# Patient Record
Sex: MALE | Race: WHITE | NOT HISPANIC OR LATINO | Employment: UNEMPLOYED | URBAN - METROPOLITAN AREA
[De-identification: names, ages, dates, MRNs, and addresses within clinical notes are randomized per-mention and may not be internally consistent; named-entity substitution may affect disease eponyms.]

---

## 2017-01-08 ENCOUNTER — HOSPITAL ENCOUNTER (EMERGENCY)
Facility: MEDICAL CENTER | Age: 32
End: 2017-01-08
Attending: EMERGENCY MEDICINE

## 2017-01-08 VITALS
SYSTOLIC BLOOD PRESSURE: 113 MMHG | RESPIRATION RATE: 18 BRPM | BODY MASS INDEX: 24.52 KG/M2 | WEIGHT: 185 LBS | HEIGHT: 73 IN | HEART RATE: 112 BPM | DIASTOLIC BLOOD PRESSURE: 94 MMHG | TEMPERATURE: 99 F

## 2017-01-08 DIAGNOSIS — F43.0 STRESS REACTION: ICD-10-CM

## 2017-01-08 DIAGNOSIS — F19.10 POLYSUBSTANCE ABUSE (HCC): ICD-10-CM

## 2017-01-08 DIAGNOSIS — G89.29 CHRONIC LOW BACK PAIN WITHOUT SCIATICA, UNSPECIFIED BACK PAIN LATERALITY: ICD-10-CM

## 2017-01-08 DIAGNOSIS — S00.11XA PERIORBITAL ECCHYMOSIS OF RIGHT EYE, INITIAL ENCOUNTER: ICD-10-CM

## 2017-01-08 DIAGNOSIS — M54.50 CHRONIC LOW BACK PAIN WITHOUT SCIATICA, UNSPECIFIED BACK PAIN LATERALITY: ICD-10-CM

## 2017-01-08 PROCEDURE — 99285 EMERGENCY DEPT VISIT HI MDM: CPT

## 2017-01-08 PROCEDURE — 93005 ELECTROCARDIOGRAM TRACING: CPT | Performed by: EMERGENCY MEDICINE

## 2017-01-08 ASSESSMENT — LIFESTYLE VARIABLES
DO YOU DRINK ALCOHOL: YES
SUBSTANCE_ABUSE: 1

## 2017-01-08 ASSESSMENT — ENCOUNTER SYMPTOMS
NAUSEA: 0
VOMITING: 0
HEADACHES: 0
BACK PAIN: 1
ABDOMINAL PAIN: 0
SHORTNESS OF BREATH: 0
FEVER: 0

## 2017-01-08 ASSESSMENT — PAIN SCALES - GENERAL: PAINLEVEL_OUTOF10: 9

## 2017-01-08 NOTE — DISCHARGE INSTRUCTIONS
Back Pain, Adult  Back pain is very common in adults. The cause of back pain is rarely dangerous and the pain often gets better over time. The cause of your back pain may not be known. Some common causes of back pain include:  · Strain of the muscles or ligaments supporting the spine.  · Wear and tear (degeneration) of the spinal disks.  · Arthritis.  · Direct injury to the back.  For many people, back pain may return. Since back pain is rarely dangerous, most people can learn to manage this condition on their own.  HOME CARE INSTRUCTIONS  Watch your back pain for any changes. The following actions may help to lessen any discomfort you are feeling:  · Remain active. It is stressful on your back to sit or  one place for long periods of time. Do not sit, drive, or  one place for more than 30 minutes at a time. Take short walks on even surfaces as soon as you are able. Try to increase the length of time you walk each day.  · Exercise regularly as directed by your health care provider. Exercise helps your back heal faster. It also helps avoid future injury by keeping your muscles strong and flexible.  · Do not stay in bed. Resting more than 1-2 days can delay your recovery.  · Pay attention to your body when you bend and lift. The most comfortable positions are those that put less stress on your recovering back. Always use proper lifting techniques, including:  · Bending your knees.  · Keeping the load close to your body.  · Avoiding twisting.  · Find a comfortable position to sleep. Use a firm mattress and lie on your side with your knees slightly bent. If you lie on your back, put a pillow under your knees.  · Avoid feeling anxious or stressed. Stress increases muscle tension and can worsen back pain. It is important to recognize when you are anxious or stressed and learn ways to manage it, such as with exercise.  · Take medicines only as directed by your health care provider. Over-the-counter  medicines to reduce pain and inflammation are often the most helpful. Your health care provider may prescribe muscle relaxant drugs. These medicines help dull your pain so you can more quickly return to your normal activities and healthy exercise.  · Apply ice to the injured area:  · Put ice in a plastic bag.  · Place a towel between your skin and the bag.  · Leave the ice on for 20 minutes, 2-3 times a day for the first 2-3 days. After that, ice and heat may be alternated to reduce pain and spasms.  · Maintain a healthy weight. Excess weight puts extra stress on your back and makes it difficult to maintain good posture.  SEEK MEDICAL CARE IF:  · You have pain that is not relieved with rest or medicine.  · You have increasing pain going down into the legs or buttocks.  · You have pain that does not improve in one week.  · You have night pain.  · You lose weight.  · You have a fever or chills.  SEEK IMMEDIATE MEDICAL CARE IF:   · You develop new bowel or bladder control problems.  · You have unusual weakness or numbness in your arms or legs.  · You develop nausea or vomiting.  · You develop abdominal pain.  · You feel faint.     This information is not intended to replace advice given to you by your health care provider. Make sure you discuss any questions you have with your health care provider.     Document Released: 12/18/2006 Document Revised: 01/08/2016 Document Reviewed: 04/21/2015  [employer] Interactive Patient Education ©2016 [employer]    Generalized Anxiety Disorder  Generalized anxiety disorder (DEMETRI) is a mental disorder. It interferes with life functions, including relationships, work, and school.  DEMETRI is different from normal anxiety, which everyone experiences at some point in their lives in response to specific life events and activities. Normal anxiety actually helps us prepare for and get through these life events and activities. Normal anxiety goes away after the event or activity is over.   DEMETRI  causes anxiety that is not necessarily related to specific events or activities. It also causes excess anxiety in proportion to specific events or activities. The anxiety associated with DEMETRI is also difficult to control. DEMETRI can vary from mild to severe. People with severe DEMETRI can have intense waves of anxiety with physical symptoms (panic attacks).   SYMPTOMS  The anxiety and worry associated with DEMETRI are difficult to control. This anxiety and worry are related to many life events and activities and also occur more days than not for 6 months or longer. People with DEMETRI also have three or more of the following symptoms (one or more in children):  · Restlessness.    · Fatigue.  · Difficulty concentrating.    · Irritability.  · Muscle tension.  · Difficulty sleeping or unsatisfying sleep.  DIAGNOSIS  DEMETRI is diagnosed through an assessment by your health care provider. Your health care provider will ask you questions about your mood, physical symptoms, and events in your life. Your health care provider may ask you about your medical history and use of alcohol or drugs, including prescription medicines. Your health care provider may also do a physical exam and blood tests. Certain medical conditions and the use of certain substances can cause symptoms similar to those associated with DEMETRI. Your health care provider may refer you to a mental health specialist for further evaluation.  TREATMENT  The following therapies are usually used to treat DEMETRI:   · Medication. Antidepressant medication usually is prescribed for long-term daily control. Antianxiety medicines may be added in severe cases, especially when panic attacks occur.    · Talk therapy (psychotherapy). Certain types of talk therapy can be helpful in treating DEMETRI by providing support, education, and guidance. A form of talk therapy called cognitive behavioral therapy can teach you healthy ways to think about and react to daily life events and activities.  · Stress  management techniques. These include yoga, meditation, and exercise and can be very helpful when they are practiced regularly.  A mental health specialist can help determine which treatment is best for you. Some people see improvement with one therapy. However, other people require a combination of therapies.     This information is not intended to replace advice given to you by your health care provider. Make sure you discuss any questions you have with your health care provider.     Document Released: 04/14/2014 Document Revised: 01/08/2016 Document Reviewed: 04/14/2014  [employer] Interactive Patient Education ©2016 [employer]
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
## 2017-01-08 NOTE — ED AVS SNAPSHOT
1/8/2017          Daron Herrera  [address] [place] [address]    Dear Daron:    [facility] wants to ensure your discharge home is safe and you or your loved ones have had all your questions answered regarding your care after you leave the hospital.    You may receive a telephone call within two days of your discharge.  This call is to make certain you understand your discharge instructions as well as ensure we provided you with the best care possible during your stay with us.     The call will only last approximately 3-5 minutes and will be done by a nurse.    Once again, we want to ensure your discharge home is safe and that you have a clear understanding of any next steps in your care.  If you have any questions or concerns, please do not hesitate to contact us, we are here for you.  Thank you for choosing [facility] for your healthcare needs.    Sincerely,    Steven Galicia    [facility]

## 2017-01-08 NOTE — ED AVS SNAPSHOT
[employer] Access Code: [email or web address]  Expires: 2/7/2017  1:57 AM    Your email address is not on file at [facility].  Email Addresses are required for you to sign up for [employer], please contact [phone] to verify your personal information and to provide your email address prior to attempting to register for [employer].    Daron [facility]  [address] [place] [address]    [employer]  A secure, online tool to manage your health information     [facility]’s [employer]® is a secure, online tool that connects you to your personalized health information from the privacy of your home -- day or night - making it very easy for you to manage your healthcare. Once the activation process is completed, you can even access your medical information using the [employer] jonathon, which is available for free in the Apple Jonathon store or Google Play store.     To learn more about [employer], visit [email or web address]    There are two levels of access available (as shown below):   My Chart Features  [facility] Primary Care Doctor [facility]  Specialists [facility]  Urgent  Care Non-[facility] Primary Care Doctor   Email your healthcare team securely and privately 24/7 X X X    Manage appointments: schedule your next appointment; view details of past/upcoming appointments X      Request prescription refills. X      View recent personal medical records, including lab and immunizations X X X X   View health record, including health history, allergies, medications X X X X   Read reports about your outpatient visits, procedures, consult and ER notes X X X X   See your discharge summary, which is a recap of your hospital and/or ER visit that includes your diagnosis, lab results, and care plan X X  X     How to register for [employer]:  Once your e-mail address has been verified, follow the following steps to sign up for [employer].     1. Go to  [email or web address]  2. Click on the Sign Up Now box, which takes you to the New Member Sign Up page. You will  need to provide the following information:  a. Enter your [employer] Access Code exactly as it appears at the top of this page. (You will not need to use this code after you’ve completed the sign-up process. If you do not sign up before the expiration date, you must request a new code.)   b. Enter your date of birth.   c. Enter your home email address.   d. Click Submit, and follow the next screen’s instructions.  3. Create a [employer] ID. This will be your [employer] login ID and cannot be changed, so think of one that is secure and easy to remember.  4. Create a [employer] password. You can change your password at any time.  5. Enter your Password Reset Question and Answer. This can be used at a later time if you forget your password.   6. Enter your e-mail address. This allows you to receive e-mail notifications when new information is available in [employer].  7. Click Sign Up. You can now view your health information.    For assistance activating your [employer] account, call [phone]

## 2017-01-08 NOTE — ED AVS SNAPSHOT
After Visit Summary                                                                                                                Daron Herrera   MRN: [id number]    Department:  [facility], Emergency Dept   Date of Visit:  1/8/2017            [facility], Emergency Dept    [address] [address]    Phone:  [phone]      You were seen by     Odette Hannah D.O.      Your Diagnosis Was     Chronic low back pain without sciatica, unspecified back pain laterality     M54.5, G89.29       Follow-up Information     1. Follow up with Your Physician In 2 days.    Specialty:  Emergency Medicine    Contact information    Varies          2. Follow up with [facility], Emergency Dept.    Specialty:  Emergency Medicine    Why:  If symptoms worsen    Contact information    [address] [place] [id number] [phone]      Medication Information     Review all of your home medications and newly ordered medications with your primary doctor and/or pharmacist as soon as possible. Follow medication instructions as directed by your doctor and/or pharmacist.     Please keep your complete medication list with you and share with your physician. Update the information when medications are discontinued, doses are changed, or new medications (including over-the-counter products) are added; and carry medication information at all times in the event of emergency situations.               Medication List      Notice     You have not been prescribed any medications.            Procedures and tests performed during your visit     EKG (ER)        Discharge Instructions       Back Pain, Adult  Back pain is very common in adults. The cause of back pain is rarely dangerous and the pain often gets better over time. The cause of your back pain may not be known. Some common causes of back pain include:  · Strain of the muscles or ligaments supporting the  spine.  · Wear and tear (degeneration) of the spinal disks.  · Arthritis.  · Direct injury to the back.  For many people, back pain may return. Since back pain is rarely dangerous, most people can learn to manage this condition on their own.  HOME CARE INSTRUCTIONS  Watch your back pain for any changes. The following actions may help to lessen any discomfort you are feeling:  · Remain active. It is stressful on your back to sit or  one place for long periods of time. Do not sit, drive, or  one place for more than 30 minutes at a time. Take short walks on even surfaces as soon as you are able. Try to increase the length of time you walk each day.  · Exercise regularly as directed by your health care provider. Exercise helps your back heal faster. It also helps avoid future injury by keeping your muscles strong and flexible.  · Do not stay in bed. Resting more than 1-2 days can delay your recovery.  · Pay attention to your body when you bend and lift. The most comfortable positions are those that put less stress on your recovering back. Always use proper lifting techniques, including:  · Bending your knees.  · Keeping the load close to your body.  · Avoiding twisting.  · Find a comfortable position to sleep. Use a firm mattress and lie on your side with your knees slightly bent. If you lie on your back, put a pillow under your knees.  · Avoid feeling anxious or stressed. Stress increases muscle tension and can worsen back pain. It is important to recognize when you are anxious or stressed and learn ways to manage it, such as with exercise.  · Take medicines only as directed by your health care provider. Over-the-counter medicines to reduce pain and inflammation are often the most helpful. Your health care provider may prescribe muscle relaxant drugs. These medicines help dull your pain so you can more quickly return to your normal activities and healthy exercise.  · Apply ice to the injured  area:  · Put ice in a plastic bag.  · Place a towel between your skin and the bag.  · Leave the ice on for 20 minutes, 2-3 times a day for the first 2-3 days. After that, ice and heat may be alternated to reduce pain and spasms.  · Maintain a healthy weight. Excess weight puts extra stress on your back and makes it difficult to maintain good posture.  SEEK MEDICAL CARE IF:  · You have pain that is not relieved with rest or medicine.  · You have increasing pain going down into the legs or buttocks.  · You have pain that does not improve in one week.  · You have night pain.  · You lose weight.  · You have a fever or chills.  SEEK IMMEDIATE MEDICAL CARE IF:   · You develop new bowel or bladder control problems.  · You have unusual weakness or numbness in your arms or legs.  · You develop nausea or vomiting.  · You develop abdominal pain.  · You feel faint.     This information is not intended to replace advice given to you by your health care provider. Make sure you discuss any questions you have with your health care provider.     Document Released: 12/18/2006 Document Revised: 01/08/2016 Document Reviewed: 04/21/2015  [employer] Interactive Patient Education ©2016 [employer]    Generalized Anxiety Disorder  Generalized anxiety disorder (DEMETRI) is a mental disorder. It interferes with life functions, including relationships, work, and school.  DEMETRI is different from normal anxiety, which everyone experiences at some point in their lives in response to specific life events and activities. Normal anxiety actually helps us prepare for and get through these life events and activities. Normal anxiety goes away after the event or activity is over.   DEMETRI causes anxiety that is not necessarily related to specific events or activities. It also causes excess anxiety in proportion to specific events or activities. The anxiety associated with DEMETRI is also difficult to control. DEMETRI can vary from mild to severe. People with severe DEMETRI  can have intense waves of anxiety with physical symptoms (panic attacks).   SYMPTOMS  The anxiety and worry associated with DEMETRI are difficult to control. This anxiety and worry are related to many life events and activities and also occur more days than not for 6 months or longer. People with DEMETRI also have three or more of the following symptoms (one or more in children):  · Restlessness.    · Fatigue.  · Difficulty concentrating.    · Irritability.  · Muscle tension.  · Difficulty sleeping or unsatisfying sleep.  DIAGNOSIS  DEMETRI is diagnosed through an assessment by your health care provider. Your health care provider will ask you questions about your mood, physical symptoms, and events in your life. Your health care provider may ask you about your medical history and use of alcohol or drugs, including prescription medicines. Your health care provider may also do a physical exam and blood tests. Certain medical conditions and the use of certain substances can cause symptoms similar to those associated with DEMETRI. Your health care provider may refer you to a mental health specialist for further evaluation.  TREATMENT  The following therapies are usually used to treat DEMETRI:   · Medication. Antidepressant medication usually is prescribed for long-term daily control. Antianxiety medicines may be added in severe cases, especially when panic attacks occur.    · Talk therapy (psychotherapy). Certain types of talk therapy can be helpful in treating DEMETRI by providing support, education, and guidance. A form of talk therapy called cognitive behavioral therapy can teach you healthy ways to think about and react to daily life events and activities.  · Stress management techniques. These include yoga, meditation, and exercise and can be very helpful when they are practiced regularly.  A mental health specialist can help determine which treatment is best for you. Some people see improvement with one therapy. However, other people  require a combination of therapies.     This information is not intended to replace advice given to you by your health care provider. Make sure you discuss any questions you have with your health care provider.     Document Released: 04/14/2014 Document Revised: 01/08/2016 Document Reviewed: 04/14/2014  [employer] Interactive Patient Education ©2016 [employer]              Patient Information     Patient Information    Following emergency treatment: all patient requiring follow-up care must return either to a private physician or a clinic if your condition worsens before you are able to obtain further medical attention, please return to the emergency room.     Billing Information    At [facility], we work to make the billing process streamlined for our patients.  Our Representatives are here to answer any questions you may have regarding your hospital bill.  If you have insurance coverage and have supplied your insurance information to us, we will submit a claim to your insurer on your behalf.  Should you have any questions regarding your bill, we can be reached online or by phone as follows:  Online: You are able pay your bills online or live chat with our representatives about any billing questions you may have. We are here to help Monday - Friday from 8:00am to 7:30pm and 9:00am - 12:00pm on Saturdays.  Please visit [email or web address]  for more information.   Phone:  [phone] or [phone]    Please note that your emergency physician, surgeon, pathologist, radiologist, anesthesiologist, and other specialists are not employed by [facility] and will therefore bill separately for their services.  Please contact them directly for any questions concerning their bills at the numbers below:     Emergency Physician Services:  [phone]  [facility]:  [phone]  [facility]:  [phone]  [facility]:   [phone]    1. Your final bill may vary from the amount quoted upon discharge if all procedures are not complete at that time, or if your doctor has additional procedures of which we are not aware. You will receive an additional bill if you return to the Emergency Department at [facility] for suture removal regardless of the facility of which the sutures were placed.     2. Please arrange for settlement of this account at the emergency registration.    3. All self-pay accounts are due in full at the time of treatment.  If you are unable to meet this obligation then payment is expected within 4-5 days.     4. If you have had radiology studies (CT, X-ray, Ultrasound, MRI), you have received a preliminary result during your emergency department visit. Please contact the radiology department [phone] to receive a copy of your final result. Please discuss the Final result with your primary physician or with the follow up physician provided.     Crisis Hotline:  [place] Crisis Hotline:  1-522-TUOFHKT or 1-613.604.5748  [place] Crisis Hotline:    1-925.120.7345 or 732-756-4440         ED Discharge Follow Up Questions    1. In order to provide you with very good care, we would like to follow up with a phone call in the next few days.  May we have your permission to contact you?     YES /  NO    2. What is the best phone number to call you? (       )_____-__________    3. What is the best time to call you?      Morning  /  Afternoon  /  Evening                   Patient Signature:  ____________________________________________________________    Date:  ____________________________________________________________

## 2017-01-08 NOTE — ED NOTES
"Chief Complaint   Patient presents with   • Medical Clearance     Brought in by [employer] for medical clearance. Pt complains of SOB d/t anxiety and CP     Pt brought back by [employer]. Placed on monitors, VSS.  at bedside to preform legal draw for officers.   Pt states he had \"one vodka drink and took heroin laced with meth\" tonight. States his anxiety is causing the SOB/CP.   Chart up for ERP.  "

## 2017-01-11 LAB — EKG IMPRESSION: NORMAL

## 2020-03-17 ENCOUNTER — HOSPITAL ENCOUNTER (EMERGENCY)
Facility: MEDICAL CENTER | Age: 35
End: 2020-03-17
Attending: EMERGENCY MEDICINE

## 2020-03-17 VITALS
WEIGHT: 180.78 LBS | TEMPERATURE: 97.1 F | DIASTOLIC BLOOD PRESSURE: 71 MMHG | HEART RATE: 69 BPM | HEIGHT: 74 IN | BODY MASS INDEX: 23.2 KG/M2 | SYSTOLIC BLOOD PRESSURE: 135 MMHG | RESPIRATION RATE: 18 BRPM | OXYGEN SATURATION: 98 %

## 2020-03-17 DIAGNOSIS — R21 RASH: ICD-10-CM

## 2020-03-17 DIAGNOSIS — H10.33 ACUTE CONJUNCTIVITIS OF BOTH EYES, UNSPECIFIED ACUTE CONJUNCTIVITIS TYPE: ICD-10-CM

## 2020-03-17 DIAGNOSIS — F19.90 IVDU (INTRAVENOUS DRUG USER): ICD-10-CM

## 2020-03-17 LAB
ANION GAP SERPL CALC-SCNC: 13 MMOL/L (ref 7–16)
BASOPHILS # BLD AUTO: 0.8 % (ref 0–1.8)
BASOPHILS # BLD: 0.04 K/UL (ref 0–0.12)
BUN SERPL-MCNC: 15 MG/DL (ref 8–22)
CALCIUM SERPL-MCNC: 8.9 MG/DL (ref 8.4–10.2)
CHLORIDE SERPL-SCNC: 101 MMOL/L (ref 96–112)
CO2 SERPL-SCNC: 22 MMOL/L (ref 20–33)
CREAT SERPL-MCNC: 0.75 MG/DL (ref 0.5–1.4)
EOSINOPHIL # BLD AUTO: 0.12 K/UL (ref 0–0.51)
EOSINOPHIL NFR BLD: 2.3 % (ref 0–6.9)
ERYTHROCYTE [DISTWIDTH] IN BLOOD BY AUTOMATED COUNT: 42.1 FL (ref 35.9–50)
GLUCOSE SERPL-MCNC: 108 MG/DL (ref 65–99)
HCT VFR BLD AUTO: 48.6 % (ref 42–52)
HGB BLD-MCNC: 15.9 G/DL (ref 14–18)
HIV 1+2 AB+HIV1 P24 AG SERPL QL IA: NORMAL
IMM GRANULOCYTES # BLD AUTO: 0.02 K/UL (ref 0–0.11)
IMM GRANULOCYTES NFR BLD AUTO: 0.4 % (ref 0–0.9)
LYMPHOCYTES # BLD AUTO: 2.04 K/UL (ref 1–4.8)
LYMPHOCYTES NFR BLD: 38.3 % (ref 22–41)
MCH RBC QN AUTO: 30.9 PG (ref 27–33)
MCHC RBC AUTO-ENTMCNC: 32.7 G/DL (ref 33.7–35.3)
MCV RBC AUTO: 94.4 FL (ref 81.4–97.8)
MONOCYTES # BLD AUTO: 0.66 K/UL (ref 0–0.85)
MONOCYTES NFR BLD AUTO: 12.4 % (ref 0–13.4)
NEUTROPHILS # BLD AUTO: 2.45 K/UL (ref 1.82–7.42)
NEUTROPHILS NFR BLD: 45.8 % (ref 44–72)
NRBC # BLD AUTO: 0 K/UL
NRBC BLD-RTO: 0 /100 WBC
PLATELET # BLD AUTO: 248 K/UL (ref 164–446)
PMV BLD AUTO: 9.9 FL (ref 9–12.9)
POTASSIUM SERPL-SCNC: 4.4 MMOL/L (ref 3.6–5.5)
RBC # BLD AUTO: 5.15 M/UL (ref 4.7–6.1)
SODIUM SERPL-SCNC: 136 MMOL/L (ref 135–145)
TREPONEMA PALLIDUM IGG+IGM AB [PRESENCE] IN SERUM OR PLASMA BY IMMUNOASSAY: NORMAL
WBC # BLD AUTO: 5.3 K/UL (ref 4.8–10.8)

## 2020-03-17 PROCEDURE — 86780 TREPONEMA PALLIDUM: CPT

## 2020-03-17 PROCEDURE — 99284 EMERGENCY DEPT VISIT MOD MDM: CPT

## 2020-03-17 PROCEDURE — 36415 COLL VENOUS BLD VENIPUNCTURE: CPT

## 2020-03-17 PROCEDURE — 80048 BASIC METABOLIC PNL TOTAL CA: CPT

## 2020-03-17 PROCEDURE — 85025 COMPLETE CBC W/AUTO DIFF WBC: CPT

## 2020-03-17 PROCEDURE — 87389 HIV-1 AG W/HIV-1&-2 AB AG IA: CPT

## 2020-03-17 RX ORDER — BUPRENORPHINE AND NALOXONE 8; 2 MG/1; MG/1
1 FILM, SOLUBLE BUCCAL; SUBLINGUAL DAILY
COMMUNITY

## 2020-03-17 RX ORDER — POLYMYXIN B SULFATE AND TRIMETHOPRIM 1; 10000 MG/ML; [USP'U]/ML
1 SOLUTION OPHTHALMIC 4 TIMES DAILY
Qty: 1 BOTTLE | Refills: 0 | Status: SHIPPED | OUTPATIENT
Start: 2020-03-17 | End: 2020-03-22

## 2020-03-17 RX ORDER — CEPHALEXIN 500 MG/1
500 CAPSULE ORAL 4 TIMES DAILY
Qty: 40 CAP | Refills: 0 | Status: SHIPPED | OUTPATIENT
Start: 2020-03-17 | End: 2020-03-27

## 2020-03-17 RX ORDER — CEPHALEXIN 500 MG/1
500 CAPSULE ORAL 4 TIMES DAILY
Qty: 40 CAP | Refills: 0 | Status: SHIPPED | OUTPATIENT
Start: 2020-03-17 | End: 2020-03-17 | Stop reason: SDUPTHER

## 2020-03-17 NOTE — ED TRIAGE NOTES
"Chief Complaint   Patient presents with   • Eye Pain     Pt with redness and pain to bilat eyes 1x week   • Rash     Generalized itchy rash for 1 week     /83   Pulse 65   Temp 36.3 °C (97.4 °F) (Temporal)   Resp 18   Ht 1.88 m (6' 2\")   Wt 82 kg (180 lb 12.4 oz)   SpO2 95%   BMI 23.21 kg/m²     "

## 2020-03-17 NOTE — ED PROVIDER NOTES
ED Provider Note    CHIEF COMPLAINT  Chief Complaint   Patient presents with   • Eye Pain     Pt with redness and pain to bilat eyes 1x week   • Rash     Generalized itchy rash for 1 week       HPI  Daron Herrera is a 34 y.o. male who presents to the emergency department with bilateral eye redness, itchiness, wateriness, and a diffuse rash.  Patient says he said the rash for approximately a week.  Seems to be worse on the torso.  Also has some red spots on his extremities.  Not on his palms or soles.  Patient also has what he thinks is pinkeye.  Both eyes are red and watery.  Somewhat itchy.  No apparent exposures.  The patient uses methamphetamine and heroin.  He is planning on going to a treatment program in [place].  He wanted to get these things checked out as part of the medical clearance prior to him going to his drug rehab program.    REVIEW OF SYSTEMS  See HPI for further details. All other systems are negative.     PAST MEDICAL HISTORY  History reviewed. No pertinent past medical history.    FAMILY HISTORY  History reviewed. No pertinent family history.    SOCIAL HISTORY  Social History     Socioeconomic History   • Marital status: Single     Spouse name: Not on file   • Number of children: Not on file   • Years of education: Not on file   • Highest education level: Not on file   Occupational History   • Not on file   Social Needs   • Financial resource strain: Not on file   • Food insecurity     Worry: Not on file     Inability: Not on file   • Transportation needs     Medical: Not on file     Non-medical: Not on file   Tobacco Use   • Smoking status: Current Every Day Smoker   Substance and Sexual Activity   • Alcohol use: Not Currently   • Drug use: Yes     Types: Intravenous     Comment: heroin and meth   • Sexual activity: Not on file   Lifestyle   • Physical activity     Days per week: Not on file     Minutes per session: Not on file   • Stress: Not on file   Relationships   • Social  "connections     Talks on phone: Not on file     Gets together: Not on file     Attends Denominational service: Not on file     Active member of club or organization: Not on file     Attends meetings of clubs or organizations: Not on file     Relationship status: Not on file   • Intimate partner violence     Fear of current or ex partner: Not on file     Emotionally abused: Not on file     Physically abused: Not on file     Forced sexual activity: Not on file   Other Topics Concern   • Not on file   Social History Narrative   • Not on file       SURGICAL HISTORY  History reviewed. No pertinent surgical history.    CURRENT MEDICATIONS  Home Medications     Reviewed by Mauricio Olsen (Pharmacy Tech) on 03/17/20 at 1202  Med List Status: Complete   Medication Last Dose Status   Buprenorphine HCl-Naloxone HCl (SUBOXONE) 8-2 MG FILM > 2 weeks Active   ERYTHROMYCIN OP 3/17/2020 Active   multivitamin (THERAGRAN) Tab 3/17/2020 Active                ALLERGIES  Allergies   Allergen Reactions   • Nsaids Hives and Shortness of Breath       PHYSICAL EXAM  VITAL SIGNS: /83   Pulse 65   Temp 36.3 °C (97.4 °F) (Temporal)   Resp 18   Ht 1.88 m (6' 2\")   Wt 82 kg (180 lb 12.4 oz)   SpO2 95%   BMI 23.21 kg/m²    Constitutional: Well developed, Well nourished, No acute distress, Non-toxic appearance.   HENT: Normocephalic, Atraumatic, Bilateral external ears normal, Oropharynx moist, No oral exudates, Nose normal.   Eyes: EOMI, bilateral conjunctiva are injected.  There is some conjunctival edema, some clear watery discharge.  No purulent discharge.  The corneas appear clear.  Anterior chambers appear clear.  Pupils are equal and round and reactive bilaterally.  Neck: Normal range of motion, No tenderness, Supple, No stridor.   Cardiovascular: Regular rate and rhythm, no audible murmur  Thorax & Lungs: Clear to auscultation bilaterally.  No Rales, rhonchi, or wheezing.  Abdomen: Bowel sounds normal, Soft, No tenderness, " No masses, No pulsatile masses.   Skin: Diffuse erythematous maculopapular rash over the torso and extremities.  Most lesions are approximately dime size some becoming confluent.  No lesions on the palms.  No lesions on the oromucosa.  Back: No tenderness, No CVA tenderness.   Extremities: Intact distal pulses, No tenderness, No cyanosis, No clubbing.  Neurologic: Alert & oriented x 3, Normal motor function, Normal sensory function, No focal deficits noted.     EKG      RADIOLOGY/PROCEDURES      COURSE & MEDICAL DECISION MAKING  Pertinent Labs & Imaging studies reviewed. (See chart for details)    Patient presents today with a nonspecific rash as well as conjunctival injection.  Eye exam is consistent with conjunctivitis.  Likely viral but will treat with eyedrops as a precaution.  Patient has a fairly nonspecific rash as well.  No palmar lesions but may be consistent with secondary syphilis.  Therefore an RPR will be obtained.  Patient has high risk behavior.  HIV test will be obtained.  CBC and metabolic panel are unremarkable.    Skin rash may be a viral exanthem.  Patient may have a viral syndrome with viral conjunctivitis and a viral exanthem.  Will be ruled out as above.  We will treat the patient with Polytrim eyedrops and give him a trial of Keflex to see if this improves his skin rash.  HIV and RPR remain pending.  Discharged home in stable condition.  Confirmed his phone number and his demographics with the patient.  He confirms that he will be available at that number should 1 of the 2 pending test come back positive.    The patient will return for new or worsening symptoms and is stable at the time of discharge.    The patient is referred to a primary physician for blood pressure management, diabetic screening, and for all other preventative health concerns.    DISPOSITION:  Patient will be discharged home in stable condition.    FOLLOW UP:  Roshan Bennett M.D.  [address] [place] [address]  [address]  [phone]    Schedule an appointment as soon as possible for a visit       [facility], Emergency Dept  [address]  [place] [id number] [phone]    If symptoms worsen      OUTPATIENT MEDICATIONS:  New Prescriptions    CEPHALEXIN (KEFLEX) 500 MG CAP    Take 1 Cap by mouth 4 times a day for 10 days.    POLYMIXIN-TRIMETHOPRIM (POLYTRIM) 65539-2.1 UNIT/ML-% SOLUTION    Place 1 Drop in both eyes 4 times a day for 5 days.     FINAL IMPRESSION  1. Acute conjunctivitis of both eyes, unspecified acute conjunctivitis type    2. Rash    3. IVDU (intravenous drug user)              Electronically signed by: Sang Holley M.D., 3/17/2020 1:20 PM
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
## 2020-03-21 ENCOUNTER — HOSPITAL ENCOUNTER (EMERGENCY)
Facility: MEDICAL CENTER | Age: 35
End: 2020-03-21
Attending: EMERGENCY MEDICINE

## 2020-03-21 VITALS
RESPIRATION RATE: 18 BRPM | DIASTOLIC BLOOD PRESSURE: 77 MMHG | TEMPERATURE: 98 F | SYSTOLIC BLOOD PRESSURE: 138 MMHG | WEIGHT: 183.86 LBS | HEIGHT: 74 IN | BODY MASS INDEX: 23.6 KG/M2 | HEART RATE: 76 BPM | OXYGEN SATURATION: 96 %

## 2020-03-21 DIAGNOSIS — S05.01XA ABRASION OF RIGHT CORNEA, INITIAL ENCOUNTER: ICD-10-CM

## 2020-03-21 PROCEDURE — 99284 EMERGENCY DEPT VISIT MOD MDM: CPT

## 2020-03-21 PROCEDURE — A9270 NON-COVERED ITEM OR SERVICE: HCPCS | Performed by: EMERGENCY MEDICINE

## 2020-03-21 PROCEDURE — 700102 HCHG RX REV CODE 250 W/ 637 OVERRIDE(OP): Performed by: EMERGENCY MEDICINE

## 2020-03-21 PROCEDURE — 700101 HCHG RX REV CODE 250: Performed by: EMERGENCY MEDICINE

## 2020-03-21 RX ORDER — PROPARACAINE HYDROCHLORIDE 5 MG/ML
1 SOLUTION/ DROPS OPHTHALMIC ONCE
Status: COMPLETED | OUTPATIENT
Start: 2020-03-21 | End: 2020-03-21

## 2020-03-21 RX ORDER — ERYTHROMYCIN 5 MG/G
OINTMENT OPHTHALMIC ONCE
Status: COMPLETED | OUTPATIENT
Start: 2020-03-21 | End: 2020-03-21

## 2020-03-21 RX ORDER — ACETAMINOPHEN 325 MG/1
650 TABLET ORAL ONCE
Status: COMPLETED | OUTPATIENT
Start: 2020-03-21 | End: 2020-03-21

## 2020-03-21 RX ORDER — ERYTHROMYCIN 5 MG/G
1 OINTMENT OPHTHALMIC 3 TIMES DAILY
Qty: 1 TUBE | Refills: 0 | Status: SHIPPED | OUTPATIENT
Start: 2020-03-21 | End: 2020-03-24

## 2020-03-21 RX ADMIN — PROPARACAINE HYDROCHLORIDE 1 DROP: 5 SOLUTION/ DROPS OPHTHALMIC at 11:30

## 2020-03-21 RX ADMIN — FLUORESCEIN SODIUM 1 MG: 1 STRIP OPHTHALMIC at 11:30

## 2020-03-21 RX ADMIN — ACETAMINOPHEN 650 MG: 325 TABLET, FILM COATED ORAL at 11:31

## 2020-03-21 RX ADMIN — ERYTHROMYCIN: 5 OINTMENT OPHTHALMIC at 11:31

## 2020-03-21 NOTE — ED PROVIDER NOTES
"ED Provider Note    Chief Complaint:   Bilateral eye irritation    HPI:  Daron Herrera is a 34 y.o. male who presents with chief complaint of bilateral eye irritation.  Reports symptoms began about a week ago, he was seen in this emergency department, diagnosed with conjunctivitis and provided with antibiotic drops.  He states that despite using antibiotic drops, his symptoms are not significantly improving.  He reports a sensation of foreign body in both eyes.  Denies any recent UV exposure, denies any known injury to the eyes, dust that could have gotten into the eyes, or other foreign bodies in the eyes.  He said no associated headaches, states his vision seems more blurred than usual.  He denies pain behind the eyes, denies nausea, denies vomiting.  Additionally, he was noted to have a diffuse rash of his most recent visit, was tested for syphilis, but this resulted negative.  He was instructed to follow-up with primary care, but has not yet scheduled an appointment.  He reports no recent fevers, no worsening rashes or lesions to the face.    Review of Systems:  See HPI for pertinent positives and negatives.     Past Medical History:       Social History:  Social History     Tobacco Use   • Smoking status: Current Every Day Smoker   Substance and Sexual Activity   • Alcohol use: Not Currently   • Drug use: Yes     Types: Intravenous     Comment: heroin and meth   • Sexual activity: Not on file       Surgical History:  patient denies any surgical history    Current Medications:  Home Medications    **Home medications have not yet been reviewed for this encounter**         Allergies:  Allergies   Allergen Reactions   • Nsaids Hives and Shortness of Breath       Physical Exam:  Vital Signs: /77   Pulse 62   Temp 36.7 °C (98.1 °F) (Temporal)   Resp 16   Ht 1.88 m (6' 2\")   Wt 83.4 kg (183 lb 13.8 oz)   SpO2 96%   BMI 23.61 kg/m²   Constitutional: Alert, no acute distress  HENT: No dermatomal " rash, few small excoriated lesions consistent with full body rash, no intraoral lesions  Eyes: Pupils equal round and reactive, fluorescein stain applied, left eye is normal-appearing, right eye has a large, well demarcated irregular corneal abrasion that is centrally located, overlying the pupil.    Neck: Supple, normal range of motion, no stridor  Cardiovascular: Extremities are warm and well perfused  Pulmonary: No respiratory distress, normal work of breathing  Skin: Warm, dry, very mild rash involving trunk and extremities, no excoriated lesions, no vesicular lesions, no petechiae, no purpura  Musculoskeletal: Normal range of motion in all extremities  Neurologic: Alert, oriented, normal speech, normal motor function, normal gait  Psychiatric: Normal and appropriate mood and affect    Medical records reviewed for continuity of care.  Patient was seen in this emergency department 3/17/2024 bilateral eye redness, itchiness, and a diffuse rash.  Patient notes that the rash was there for approximately 1 week.  He does have a history of heroin and methamphetamine use.  Rash thought to be conjunctivitis, likely viral but patient was discharged with a prescription for erythromycin ointment.  Rash may be consistent with syphilis, RPR testing was obtained.  I reviewed the laboratory results, HIV and syphilis testing have resulted negative.    ED Medications Administered:  Medications   erythromycin ophthalmic ointment (has no administration in time range)   acetaminophen (TYLENOL) tablet 650 mg (has no administration in time range)   proparacaine (OPTHAINE) 0.5 % ophthalmic solution 1 Drop (1 Drop Both Eyes Given 3/21/20 1130)   fluorescein ophthalmic strip 1 mg (1 mg Both Eyes Given 3/21/20 1130)     MDM:  Patient presents with eye pain, initially reported bilateral eye pain, but later stated that the right eye was significantly more painful.  Found to have a large corneal abrasion of the right eye.  This was treated  with erythromycin ointment.  He was referred to Dr. Chicas, ophthalmologist on-call, counseled to schedule a follow-up appointment when they open Monday morning. Return precautions were discussed with the patient, and provided in written form with the patient's discharge instructions.     With regard to his rash, symptoms are neither worsening nor improving since prior visit.  He has no petechiae, no purpura, no bullous lesions.  Rash does not appear consistent with zoster.  Syphilis testing was negative at his visit earlier this week.    This patient was not identified to have risk factors for COVID-19.  Personal protective equipment including N95 surgical respirator, goggles, gown, and gloves were used during this encounter.     Blood pressure today is greater than 120/80, patient is instructed to follow up with primary care provider for blood pressure recheck.    Disposition:  Discharge home in stable condition    Final Impression:  1. Abrasion of right cornea, initial encounter        Electronically signed by: Ursula Hanson MD, 3/21/2020 11:30 AM    Addendum:  I received a call from someone who identified themselves as the patient's mother. She was requesting results of the patient's HIV and syphilis testing. I explained that I could not provide any results due to privacy concerns, and let her know that test results, if available, would have been provided directly to the patient.    Ursula Hanson MD  3/21/20 12:25 PM

## 2020-03-21 NOTE — ED TRIAGE NOTES
Chief Complaint   Patient presents with   • Eye Pain     complains of blurry vision, bilateral eye pain      pt states he was seen in ER about 2 days and was prescribed ABX. Pt states pain and vision are worse.     Pt does not meet  [facility] PUI criteria. Pt has not traveled  internationally or domestic, has not had direct contact with known COVID-19 positive patient, or/ and  does not have fever or respiratory symptoms.    Pt was walked in by dad who had recently traveled to [facility], charge RN aware. Dad was asked to wait for pt in car due to high risk of exposure, verbalized understanding.

## 2020-03-21 NOTE — DISCHARGE INSTRUCTIONS
Please call the ophthalmology clinic Monday morning to schedule a close follow-up appointment for complete recheck.  You may take Tylenol as needed for pain.  Return to the emergency department if you develop any new or worsening symptoms including worsening pain, redness, drainage from the eye, or any further concerns.  Please use the ointment as prescribed in the right eye.